# Patient Record
Sex: FEMALE | Race: WHITE | NOT HISPANIC OR LATINO | Employment: FULL TIME | URBAN - METROPOLITAN AREA
[De-identification: names, ages, dates, MRNs, and addresses within clinical notes are randomized per-mention and may not be internally consistent; named-entity substitution may affect disease eponyms.]

---

## 2017-04-05 ENCOUNTER — HOSPITAL ENCOUNTER (OUTPATIENT)
Dept: MAMMOGRAPHY | Facility: HOSPITAL | Age: 57
Discharge: HOME | End: 2017-04-05
Attending: OBSTETRICS & GYNECOLOGY

## 2017-04-05 DIAGNOSIS — Z12.31 VISIT FOR SCREENING MAMMOGRAM: ICD-10-CM

## 2019-02-06 ENCOUNTER — RECORDS - HEALTHEAST (OUTPATIENT)
Dept: MAMMOGRAPHY | Facility: CLINIC | Age: 59
End: 2019-02-06

## 2019-02-06 DIAGNOSIS — Z12.31 ENCOUNTER FOR SCREENING MAMMOGRAM FOR MALIGNANT NEOPLASM OF BREAST: ICD-10-CM

## 2019-06-27 ENCOUNTER — OFFICE VISIT - HEALTHEAST (OUTPATIENT)
Dept: FAMILY MEDICINE | Facility: CLINIC | Age: 59
End: 2019-06-27

## 2019-06-27 DIAGNOSIS — R21 SKIN MACULE OR MACULAR RASH: ICD-10-CM

## 2019-06-27 DIAGNOSIS — Z12.11 SCREEN FOR COLON CANCER: ICD-10-CM

## 2019-06-27 ASSESSMENT — MIFFLIN-ST. JEOR: SCORE: 1264.42

## 2019-10-13 ASSESSMENT — MIFFLIN-ST. JEOR: SCORE: 1282.57

## 2019-10-14 ENCOUNTER — SURGERY - HEALTHEAST (OUTPATIENT)
Dept: SURGERY | Facility: HOSPITAL | Age: 59
End: 2019-10-14

## 2019-10-14 ENCOUNTER — ANESTHESIA - HEALTHEAST (OUTPATIENT)
Dept: SURGERY | Facility: HOSPITAL | Age: 59
End: 2019-10-14

## 2019-10-14 ASSESSMENT — MIFFLIN-ST. JEOR: SCORE: 1281.2

## 2019-10-15 ASSESSMENT — MIFFLIN-ST. JEOR: SCORE: 1305.24

## 2019-10-16 ENCOUNTER — COMMUNICATION - HEALTHEAST (OUTPATIENT)
Dept: SURGERY | Facility: CLINIC | Age: 59
End: 2019-10-16

## 2019-10-21 ENCOUNTER — AMBULATORY - HEALTHEAST (OUTPATIENT)
Dept: SURGERY | Facility: CLINIC | Age: 59
End: 2019-10-21

## 2019-10-21 ENCOUNTER — AMBULATORY - HEALTHEAST (OUTPATIENT)
Dept: LAB | Facility: CLINIC | Age: 59
End: 2019-10-21

## 2019-10-21 ENCOUNTER — OFFICE VISIT - HEALTHEAST (OUTPATIENT)
Dept: SURGERY | Facility: CLINIC | Age: 59
End: 2019-10-21

## 2019-10-21 DIAGNOSIS — K35.32 PERFORATED APPENDICITIS: ICD-10-CM

## 2019-10-21 ASSESSMENT — MIFFLIN-ST. JEOR: SCORE: 1278.03

## 2019-10-22 ENCOUNTER — COMMUNICATION - HEALTHEAST (OUTPATIENT)
Dept: SURGERY | Facility: CLINIC | Age: 59
End: 2019-10-22

## 2019-10-22 LAB
BASOPHILS # BLD AUTO: 0.1 THOU/UL (ref 0–0.2)
BASOPHILS NFR BLD AUTO: 1 % (ref 0–2)
EOSINOPHIL COUNT (ABSOLUTE): 0.8 THOU/UL (ref 0–0.4)
EOSINOPHIL NFR BLD AUTO: 6 % (ref 0–6)
ERYTHROCYTE [DISTWIDTH] IN BLOOD BY AUTOMATED COUNT: 14.6 % (ref 11–14.5)
HCT VFR BLD AUTO: 34.3 % (ref 35–47)
HGB BLD-MCNC: 10.9 G/DL (ref 12–16)
LYMPHOCYTES # BLD AUTO: 2.2 THOU/UL (ref 0.8–4.4)
LYMPHOCYTES NFR BLD AUTO: 17 % (ref 20–40)
MCH RBC QN AUTO: 27.7 PG (ref 27–34)
MCHC RBC AUTO-ENTMCNC: 31.8 G/DL (ref 32–36)
MCV RBC AUTO: 87 FL (ref 80–100)
MONOCYTES # BLD AUTO: 0.6 THOU/UL (ref 0–0.9)
MONOCYTES NFR BLD AUTO: 5 % (ref 2–10)
PLAT MORPH BLD: NORMAL
PLATELET # BLD AUTO: 336 THOU/UL (ref 140–440)
PMV BLD AUTO: 11.3 FL (ref 8.5–12.5)
POLYCHROMASIA BLD QL SMEAR: ABNORMAL
RBC # BLD AUTO: 3.94 MILL/UL (ref 3.8–5.4)
TOTAL NEUTROPHILS-ABS(DIFF): 9 THOU/UL (ref 2–7.7)
TOTAL NEUTROPHILS-REL(DIFF): 71 % (ref 50–70)
WBC: 12.7 THOU/UL (ref 4–11)

## 2021-05-22 ENCOUNTER — HEALTH MAINTENANCE LETTER (OUTPATIENT)
Age: 61
End: 2021-05-22

## 2021-05-26 ENCOUNTER — RECORDS - HEALTHEAST (OUTPATIENT)
Dept: ADMINISTRATIVE | Facility: CLINIC | Age: 61
End: 2021-05-26

## 2021-05-27 ENCOUNTER — RECORDS - HEALTHEAST (OUTPATIENT)
Dept: ADMINISTRATIVE | Facility: CLINIC | Age: 61
End: 2021-05-27

## 2021-05-30 NOTE — PROGRESS NOTES
Assessment/ Plan:         1. Skin macule or macular rash  Ambulatory referral to Dermatology   2. Screen for colon cancer       May be consistent with a deep cystic acne however skin lesion cannot be ruled out to not be malignant in nature.  I did recommend further evaluation by dermatology and referral was placed for patient today.     She is not establishing care with our clinic she reports that she is up-to-date on her colonoscopy and mammogram.      Subjective:      Michelle Sarkar is a 58 y.o. female who presents as a new patient. She has concerns of a red spot on her face that started about 3 weeks ago.  She denies any new soaps, lotions, or detergents.  The red eleanor has been flat without presence of acne, scaling.  It has not changed in the last 3 weeks.  No previous symptoms or similar lesions in the past.  She does not intend on establishing care in our clinic.  Her clinic was close to her work and was convenient for her.  She has no significant past medical history that I reviewed today.  Denies any other concerns today.  The following portions of the patient's history were reviewed and updated as appropriate: allergies, current medications, past family history, past medical history, past social history, past surgical history and problem list.    Patient Active Problem List   Diagnosis     Chronic cholecystitis     Heart murmur       No current outpatient medications on file.     History reviewed. No pertinent past medical history.  Past Surgical History:   Procedure Laterality Date     CHOLECYSTECTOMY       TUBAL LIGATION       Social History     Socioeconomic History     Marital status:      Spouse name: Not on file     Number of children: Not on file     Years of education: Not on file     Highest education level: Not on file   Occupational History     Not on file   Social Needs     Financial resource strain: Not on file     Food insecurity:     Worry: Not on file     Inability: Not on file  "    Transportation needs:     Medical: Not on file     Non-medical: Not on file   Tobacco Use     Smoking status: Never Smoker     Smokeless tobacco: Never Used   Substance and Sexual Activity     Alcohol use: Never     Frequency: Never     Drug use: Never     Sexual activity: Yes     Partners: Male   Lifestyle     Physical activity:     Days per week: Not on file     Minutes per session: Not on file     Stress: Not on file   Relationships     Social connections:     Talks on phone: Not on file     Gets together: Not on file     Attends Hindu service: Not on file     Active member of club or organization: Not on file     Attends meetings of clubs or organizations: Not on file     Relationship status: Not on file     Intimate partner violence:     Fear of current or ex partner: Not on file     Emotionally abused: Not on file     Physically abused: Not on file     Forced sexual activity: Not on file   Other Topics Concern     Not on file   Social History Narrative     Not on file     Family History   Problem Relation Age of Onset     Ovarian cancer Mother 68        passed at 72     Brain cancer Father      Colon cancer Maternal Grandmother      Breast cancer Neg Hx      Review of Systems   Pertinent items are noted in HPI.      Objective:      /70   Pulse 72   Resp 16   Ht 5' 3\" (1.6 m)   Wt 161 lb (73 kg)   BMI 28.52 kg/m      General appearance: alert, appears stated age and cooperative  Head: Normocephalic, without obvious abnormality, atraumatic  Skin: flat macular rash present on right cheek. Measures .7cmx 1.3cm  No other significant changes or abnormalities are present on the skin. Slight palpation felt deep within the skin.     Blossom Paulino, GEORGETTE  5:03 PM  "

## 2021-06-02 NOTE — PROGRESS NOTES
RUSLANLA paperwork received from patient during appointment. Once completed patient requests forms faxed to:       [facility].  [facility] Leave   F: [phone]      Inna Kaba LPN

## 2021-06-02 NOTE — ANESTHESIA POSTPROCEDURE EVALUATION
Patient: Michelle Sarkar  APPENDECTOMY, LAPAROSCOPIC  Anesthesia type: general    Patient location: PACU  Last vitals:   Vitals Value Taken Time   BP 97/52 10/14/2019  8:50 AM   Temp 37.5  C (99.5  F) 10/14/2019  8:54 AM   Pulse 77 10/14/2019  8:56 AM   Resp 20 10/14/2019  8:56 AM   SpO2 92 % 10/14/2019  8:56 AM   Vitals shown include unvalidated device data.  Post vital signs: stable  Level of consciousness: awake and responds to simple questions  Post-anesthesia pain: pain controlled  Post-anesthesia nausea and vomiting: no  Pulmonary: unassisted, return to baseline  Cardiovascular: stable and blood pressure at baseline  Hydration: adequate  Anesthetic events: no    QCDR Measures:  ASA# 11 - Nell-op Cardiac Arrest: ASA11B - Patient did NOT experience unanticipated cardiac arrest  ASA# 12 - Nell-op Mortality Rate: ASA12B - Patient did NOT die  ASA# 13 - PACU Re-Intubation Rate: ASA13B - Patient did NOT require a new airway mgmt  ASA# 10 - Composite Anes Safety: ASA10A - No serious adverse event    Additional Notes: Patient with minimal pain. Grateful for care. Recovering quite nicely.

## 2021-06-02 NOTE — PROGRESS NOTES
"HPI: Pt is here for follow up of a laparoscopic appendectomy status post perforated appendicitis with Dr. Nieves 10/14/2019.   she is doing well.  Pain is well controlled.  No difficulties with the surgical wound/wounds.  she is eating well and denies fever and chills.     SYED scant output    /70 (Patient Site: Right Arm, Patient Position: Sitting, Cuff Size: Adult Regular)   Pulse 82   Ht 5' 3\" (1.6 m)   Wt 164 lb (74.4 kg)   SpO2 97%   Breastfeeding? No   BMI 29.05 kg/m      EXAM:  GENERAL:Appears well  ABDOMEN:  Soft, +BS  SURGICAL WOUNDS:  Incisions healing well, no enduration or drainage.  Serous fluid.  Removed  APPENDIX, APPENDECTOMY:     1)  MARKED ACUTE APPENDICITIS AND PERIAPPENDICITIS WITH GANGRENOUS NECROSIS AND           CENTRAL PERFORATION     2)  NO EVIDENCE OF DYSPLASIA OR MALIGNANCY    Assessment/Plan: .  Recheck CBC since her discharge white blood count was 13.5.  Patient is doing very well and looks well.  Repeat white blood count was 12.7 which I expected to be a little better at this point.  Will have patient follow-up with us next week.  If any increase in fever or abdominal pain she is instructed to go back to the emergency room for CT.    [facility] Department of Surgery      "

## 2021-06-02 NOTE — ANESTHESIA CARE TRANSFER NOTE
Last vitals:   Vitals:    10/14/19 0833   BP: 109/55   Pulse: 84   Resp: 18   Temp: 38.1  C (100.5  F)   SpO2: 99%     Patient's level of consciousness is drowsy  Spontaneous respirations: yes  Maintains airway independently: yes  Dentition unchanged: yes  Oropharynx: oropharynx clear of all foreign objects    QCDR Measures:  ASA# 20 - Surgical Safety Checklist: WHO surgical safety checklist completed prior to induction    PQRS# 430 - Adult PONV Prevention: 4558F - Pt received => 2 anti-emetic agents (different classes) preop & intraop  ASA# 8 - Peds PONV Prevention: NA - Not pediatric patient, not GA or 2 or more risk factors NOT present  PQRS# 424 - Nell-op Temp Management: 4559F - At least one body temp DOCUMENTED => 35.5C or 95.9F within required timeframe  PQRS# 426 - PACU Transfer Protocol: - Transfer of care checklist used  ASA# 14 - Acute Post-op Pain: ASA14B - Patient did NOT experience pain >= 7 out of 10

## 2021-06-02 NOTE — TELEPHONE ENCOUNTER
Please call patient and let her know that her white count is 12.7 which is better but not completely normal.  Due to the fact that she looks so good I do not think that we need to do any further CAT scan at this time if she develops any fever or increase in abdominal pain she should let us know.  If she has any residual symptoms next week then she should just follow-up with us in postop clinic however if she feels excellent with no abdominal pain or issues she does not need to follow-up

## 2021-06-02 NOTE — PROGRESS NOTES
Completed and signed forms sent to:    [facility]  Attn: [facility] Leave   F: [phone]    Originals mailed to home address on file.     Inna Kaba  [facility]  General Surgery  P: [phone]  F: [phone]

## 2021-06-02 NOTE — TELEPHONE ENCOUNTER
Called and spoke with pt regarding message per Livier Figueroa PA-C.     She had no further questions. She did inquire about her [facility] paperwork, which has been filled out and faxed today.

## 2021-06-02 NOTE — ANESTHESIA PREPROCEDURE EVALUATION
Anesthesia Evaluation        Airway   Mallampati: III  Neck ROM: full   Pulmonary     breath sounds clear to auscultation                         Cardiovascular   Exercise tolerance: > or = 4 METS  ECG reviewed  Rhythm: regular  Rate: normal,         Neuro/Psych      Endo/Other       GI/Hepatic/Renal      Comments: Acute appendicitis          Dental                         Anesthesia Plan  Planned anesthetic: general endotracheal    ASA 1 - emergent     Anesthetic plan and risks discussed with: patient  Anesthesia plan special considerations: rapid sequence induction, antiemetics,   Post-op plan: routine recovery

## 2021-06-03 VITALS
SYSTOLIC BLOOD PRESSURE: 124 MMHG | HEIGHT: 63 IN | DIASTOLIC BLOOD PRESSURE: 70 MMHG | OXYGEN SATURATION: 97 % | BODY MASS INDEX: 29.06 KG/M2 | WEIGHT: 164 LBS | HEART RATE: 82 BPM

## 2021-06-03 VITALS — BODY MASS INDEX: 30.12 KG/M2 | HEIGHT: 63 IN | WEIGHT: 170 LBS

## 2021-06-03 VITALS — HEIGHT: 63 IN | BODY MASS INDEX: 28.53 KG/M2 | WEIGHT: 161 LBS

## 2021-06-16 PROBLEM — K35.209 ACUTE APPENDICITIS WITH GENERALIZED PERITONITIS, WITHOUT ABSCESS, UNSPECIFIED WHETHER GANGRENE PRESENT, UNSPECIFIED WHETHER PERFORATION PRESENT: Status: ACTIVE | Noted: 2019-10-13

## 2021-06-16 PROBLEM — K35.80 ACUTE APPENDICITIS: Status: ACTIVE | Noted: 2019-10-14

## 2021-07-13 ENCOUNTER — RECORDS - HEALTHEAST (OUTPATIENT)
Dept: ADMINISTRATIVE | Facility: CLINIC | Age: 61
End: 2021-07-13

## 2021-07-21 ENCOUNTER — RECORDS - HEALTHEAST (OUTPATIENT)
Dept: ADMINISTRATIVE | Facility: CLINIC | Age: 61
End: 2021-07-21

## 2021-07-22 ENCOUNTER — RECORDS - HEALTHEAST (OUTPATIENT)
Dept: SCHEDULING | Facility: CLINIC | Age: 61
End: 2021-07-22

## 2021-07-22 DIAGNOSIS — Z12.31 OTHER SCREENING MAMMOGRAM: ICD-10-CM

## 2021-09-11 ENCOUNTER — HEALTH MAINTENANCE LETTER (OUTPATIENT)
Age: 61
End: 2021-09-11

## 2022-06-11 ENCOUNTER — OFFICE VISIT (OUTPATIENT)
Dept: FAMILY MEDICINE | Facility: CLINIC | Age: 62
End: 2022-06-11
Payer: COMMERCIAL

## 2022-06-11 VITALS
DIASTOLIC BLOOD PRESSURE: 76 MMHG | OXYGEN SATURATION: 98 % | SYSTOLIC BLOOD PRESSURE: 135 MMHG | TEMPERATURE: 98.2 F | HEART RATE: 71 BPM

## 2022-06-11 DIAGNOSIS — L02.92 BOIL: Primary | ICD-10-CM

## 2022-06-11 PROCEDURE — 99213 OFFICE O/P EST LOW 20 MIN: CPT | Performed by: NURSE PRACTITIONER

## 2022-06-11 ASSESSMENT — ENCOUNTER SYMPTOMS
WOUND: 1
RESPIRATORY NEGATIVE: 1
CONSTITUTIONAL NEGATIVE: 1
MUSCULOSKELETAL NEGATIVE: 1
HEMATOLOGIC/LYMPHATIC NEGATIVE: 1
EYES NEGATIVE: 1
NEUROLOGICAL NEGATIVE: 1
GASTROINTESTINAL NEGATIVE: 1
CARDIOVASCULAR NEGATIVE: 1
PSYCHIATRIC NEGATIVE: 1

## 2022-06-12 NOTE — PROGRESS NOTES
"Assessment & Plan        ICD-10-CM    1. Boil  L02.92 Acute - patient with onset of \"bump\" in right gluteal cleft ~2 days ago with worsening of pain as noted. Instructed to cleanse daily and cover with bandage and warm pack as much as tolerated throughout the day to help come to a head. Should it worsen or not improve in 2-3 days, follow-up with E-visit for abx regimen or in-person de oliveira lancing.      I spent a total of 20 minutes on the day of the visit.   Time spent doing chart review, history and exam, documentation and further activities per the note    Return in about 3 days (around 6/14/2022).    LORENA Merritt CNP  [facility] TEE Martinez is a 61 year old female who presents to clinic today for the following health issues:  Chief Complaint   Patient presents with     Sore     Sore, bump on right buttock.  Ongoing x2 days.      Patient with onset of pain of right gluteal cleft ~2-3 days ago that has gradually worsened and increased in \"feeling of something being there\". No fever chills or trauma to site.     Review of Systems   Constitutional: Negative.    HENT: Negative.    Eyes: Negative.    Respiratory: Negative.    Cardiovascular: Negative.    Gastrointestinal: Negative.    Genitourinary: Negative.    Musculoskeletal: Negative.    Skin: Positive for wound.   Neurological: Negative.    Hematological: Negative.    Psychiatric/Behavioral: Negative.          Objective    /76 (BP Location: Right arm, Patient Position: Sitting, Cuff Size: Adult Regular)   Pulse 71   Temp 98.2  F (36.8  C) (Tympanic)   SpO2 98%   Physical Exam  Constitutional:       Appearance: Normal appearance. She is normal weight.   HENT:      Head: Normocephalic.      Mouth/Throat:      Mouth: Mucous membranes are moist.   Eyes:      Extraocular Movements: Extraocular movements intact.   Pulmonary:      Effort: Pulmonary effort is normal.   Musculoskeletal:         General: Normal range of " motion.   Skin:     General: Skin is warm and dry.          Neurological:      Mental Status: She is alert.   Psychiatric:         Mood and Affect: Mood normal.         Behavior: Behavior normal.         Thought Content: Thought content normal.         Judgment: Judgment normal.

## 2022-06-12 NOTE — PATIENT INSTRUCTIONS
Cleanse well daily, apply antiobiotic cream and cover with bandage  Warm pack as needed with 10 minute break in between applications  If not better or improved in 3 days, complete E-visit for antibiotics

## 2022-06-18 ENCOUNTER — HEALTH MAINTENANCE LETTER (OUTPATIENT)
Age: 62
End: 2022-06-18

## 2022-10-29 ENCOUNTER — HEALTH MAINTENANCE LETTER (OUTPATIENT)
Age: 62
End: 2022-10-29

## 2023-05-22 ENCOUNTER — OFFICE VISIT (OUTPATIENT)
Dept: FAMILY MEDICINE | Facility: CLINIC | Age: 63
End: 2023-05-22
Payer: COMMERCIAL

## 2023-05-22 VITALS
SYSTOLIC BLOOD PRESSURE: 137 MMHG | HEART RATE: 65 BPM | RESPIRATION RATE: 16 BRPM | DIASTOLIC BLOOD PRESSURE: 79 MMHG | TEMPERATURE: 98.6 F | OXYGEN SATURATION: 99 %

## 2023-05-22 DIAGNOSIS — H69.92 DYSFUNCTION OF LEFT EUSTACHIAN TUBE: Primary | ICD-10-CM

## 2023-05-22 DIAGNOSIS — J01.90 ACUTE SINUSITIS WITH SYMPTOMS > 10 DAYS: ICD-10-CM

## 2023-05-22 PROCEDURE — 99214 OFFICE O/P EST MOD 30 MIN: CPT | Performed by: PHYSICIAN ASSISTANT

## 2023-05-22 RX ORDER — AMOXICILLIN 875 MG
875 TABLET ORAL 2 TIMES DAILY
Qty: 20 TABLET | Refills: 0 | Status: SHIPPED | OUTPATIENT
Start: 2023-05-22 | End: 2023-06-01

## 2023-05-22 RX ORDER — FLUTICASONE PROPIONATE 50 MCG
2 SPRAY, SUSPENSION (ML) NASAL DAILY
Qty: 18.2 ML | Refills: 0 | Status: SHIPPED | OUTPATIENT
Start: 2023-05-22

## 2023-05-22 NOTE — PROGRESS NOTES
Patient presents with:  Ear Problem: X 2wks, Lt ear, has a cold and having a lot of congestion and runny nose, was cleaning ear with q-tip and want to make sure didn't injury or push wax further into ear, no headache or dizziness    (H69.82) Dysfunction of left eustachian tube  (primary encounter diagnosis)  Comment:   Plan: fluticasone (FLONASE) 50 MCG/ACT nasal spray            (J01.90) Acute sinusitis with symptoms > 10 days  Comment:   Plan: amoxicillin (AMOXIL) 875 MG tablet          Saline nasal spray or steam treatments to help flush nasal congestion out.    Stay on nasal steroid for minimum of 2 weeks.  Today do it twice to get things started.          If not improving or if condition worsens, follow up with your Primary Care Provider      31 minutes spent by me on the date of the encounter doing chart review, patient visit and documentation       SUBJECTIVE:   Michelle Sarkar is a 62 year old female who presents today with left ear discomfort, feeling blocked for the past couple of weeks, worse in the past few days, after cleaning her ear with a Q-tip.  Denies any drainage from her ear. She has had some nasal congestion for the past 2 weeks but it seems to be lessening.  Denies any fevers or cough.      No past medical history on file.      Current Outpatient Medications   Medication Sig Dispense Refill     Multiple Vitamins-Iron (DAILY-ETHAN/IRON/BETA-CAROTENE) TABS TAKE 1 TABLET BY MOUTH DAILY. (Patient not taking: Reported on 10/19/2020) 30 tablet 7     Social History     Tobacco Use     Smoking status: Never Smoker     Smokeless tobacco: Never Used   Substance Use Topics     Alcohol use: Not on file     Family History   Problem Relation Age of Onset     Diabetes Mother      Diabetes Father          ROS:    10 point ROS of systems including Constitutional, Eyes, Respiratory, Cardiovascular, Gastroenterology, Genitourinary, Integumentary, Muscularskeletal, Psychiatric ,neurological were all negative  except for pertinent positives noted in my HPI       OBJECTIVE:  /79   Pulse 65   Temp 98.6  F (37  C) (Oral)   Resp 16   SpO2 99%   Physical Exam:  GENERAL APPEARANCE: healthy, alert and no distress  EYES: EOMI,  PERRL, conjunctiva clear  HENT: ear canals and TM's normal.  Nose and mouth without ulcers, erythema or lesions  HENT: nasal turbinates boggy with bluish hue, with left turbinate nearly occluding left nares, rhinorrhea yellow and left TM is retracted  NECK: supple, nontender, no lymphadenopathy  RESP: lungs clear to auscultation - no rales, rhonchi or wheezes  CV: regular rates and rhythm, normal S1 S2, no murmur noted  NEURO: Normal strength and tone, sensory exam grossly normal,  normal speech and mentation  SKIN: no suspicious lesions or rashes

## 2023-05-22 NOTE — PATIENT INSTRUCTIONS
(H69.82) Dysfunction of left eustachian tube  (primary encounter diagnosis)  Comment:   Plan: fluticasone (FLONASE) 50 MCG/ACT nasal spray            (J01.90) Acute sinusitis with symptoms > 10 days  Comment:   Plan: amoxicillin (AMOXIL) 875 MG tablet          Saline nasal spray or steam treatments to help flush nasal congestion out.    Stay on nasal steroid for minimum of 2 weeks.  Today do it twice to get things started.

## 2023-06-01 ENCOUNTER — HEALTH MAINTENANCE LETTER (OUTPATIENT)
Age: 63
End: 2023-06-01

## 2023-06-25 ENCOUNTER — HEALTH MAINTENANCE LETTER (OUTPATIENT)
Age: 63
End: 2023-06-25

## 2024-10-27 ENCOUNTER — HEALTH MAINTENANCE LETTER (OUTPATIENT)
Age: 64
End: 2024-10-27

## 2025-06-15 ENCOUNTER — HEALTH MAINTENANCE LETTER (OUTPATIENT)
Age: 65
End: 2025-06-15